# Patient Record
(demographics unavailable — no encounter records)

---

## 2022-07-08 NOTE — RAD REPORT
EXAM DESCRIPTION:  Rubén Single View7/8/2022 4:30 pm

 

CLINICAL HISTORY:  Palpitations

 

COMPARISON:  none

 

FINDINGS:   The lungs appear clear of acute infiltrate. The heart is normal size

 

IMPRESSION:   No acute abnormalities displayed

## 2022-07-08 NOTE — EDPHYS
Physician Documentation                                                                           

 [facility]                                                                 

Name: Kaylin Trivedi                                                                             

Age: 29 yrs                                                                                       

Sex: Female                                                                                       

: 1992                                                                                   

MRN: [id number]                                                                                   

Arrival Date: 2022                                                                          

Time: 15:11                                                                                       

Account#: [id number]                                                                            

Bed 4                                                                                             

Private MD:                                                                                       

ED Physician King Crespo                                                                         

HPI:                                                                                              

                                                                                             

15:42 This 29 yrs old  Female presents to ER via Ambulatory with complaints of rapid  ms3 

      heart rate, shaky, Near Syncope.                                                            

15:42 The patient has experienced near-syncope. Onset: The symptoms/episode began/occurred    ms3 

      acutely, 30 minute(s) ago. Duration: This was a single episode. Associated injury: The      

      patient did not suffer any apparent associated injury. Associated signs and symptoms:       

      Pertinent positives: shortness of breath, tingling. Current symptoms: shortness of          

      breath, chest pain.                                                                         

                                                                                                  

OB/GYN:                                                                                           

15:20 LMP 2022                                                                           ha1 

                                                                                                  

Historical:                                                                                       

- Allergies:                                                                                      

15:26 No Known Allergies;                                                                     aa5 

- PMHx:                                                                                           

15:26 None;                                                                                   aa5 

- PSHx:                                                                                           

15:26  section;                                                                       aa5 

                                                                                                  

- Immunization history:: Adult Immunizations unknown.                                             

- Social history:: Smoking status: Patient denies any tobacco usage or history of.                

                                                                                                  

                                                                                                  

ROS:                                                                                              

15:42 Constitutional: Negative for fever, and chills. ENT: Negative for injury, pain, and     ms3 

      discharge, Neck: Negative for injury, pain, and swelling.                                   

15:42 Abdomen/GI: Negative for abdominal pain, nausea, vomiting, diarrhea, and constipation,      

      MS/Extremity: Negative for injury and deformity, Skin: Negative for injury, rash, and       

      discoloration, Neuro: Negative for headache, weakness, numbness, tingling. Psych:           

      Negative for depression, anxiety, suicide ideation, homicidal ideation, and                 

      hallucinations.                                                                             

15:42 Cardiovascular: Positive for chest pain.                                                    

15:42 Respiratory: Positive for shortness of breath.                                              

15:42 All other systems are negative.                                                             

                                                                                                  

Exam:                                                                                             

15:41 ECG was reviewed by the Attending Physician.                                            ms3 

15:42 Constitutional:  This is a well developed, well nourished patient who is awake, alert,  ms3 

      and in no acute distress. Head/Face:  Normocephalic, atraumatic. Eyes:  Pupils equal        

      round and reactive to light, extra-ocular motions intact.  Lids and lashes normal.          

      Conjunctiva and sclera are non-icteric and not injected.  Periorbital areas with no         

      swelling, redness, or edema. Neck:  Trachea midline, no cervical lymphadenopathy.           

      Supple, full range of motion without nuchal rigidity, or vertebral point tenderness.        

      No Meningismus. Chest/axilla:  Normal chest wall appearance and motion.  Nontender with     

      no deformity.   Respiratory:  Lungs have equal breath sounds bilaterally, clear to          

      auscultation and percussion.  No rales, rhonchi or wheezes noted.  No increased work of     

      breathing, no retractions or nasal flaring. Abdomen/GI:  Soft, non-tender, with normal      

      bowel sounds.  No distension or tympany.  No guarding or rebound.  No evidence of           

      tenderness throughout. Skin:  Warm, dry with normal turgor.  Normal color with no           

      rashes, no lesions, and no evidence of cellulitis. MS/ Extremity:  Pulses equal, no         

      cyanosis.  Neurovascular intact.  Full, normal range of motion. Neuro:  Awake and           

      alert, GCS 15, oriented to person, place, time, and situation.  Cranial nerves II-XII       

      grossly intact.  Motor strength 5/5 in all extremities.  Sensory grossly intact.            

      Cerebellar exam normal.  Normal gait. Psych:  Awake, alert, with orientation to person,     

      place and time.  Behavior, mood, and affect are within normal limits.                       

15:42 Cardiovascular: Rate: tachycardic, Rhythm: regular, Pulses: no pulse deficits are           

      appreciated, Heart sounds: normal, Edema: is not appreciated.                               

                                                                                                  

Vital Signs:                                                                                      

15:15  / 93; Pulse 108; Resp 18 S; Temp 100.8(O); Pulse Ox 99% on R/A; Weight 86 kg     aa5 

      (R); Height 5 ft. 0 in. (152.40 cm) (R);                                                    

15:20  / 93 Supine; Pulse 104; Resp 16 S; Temp 100.8(T); Pulse Ox 99% on R/A;           ha1 

17:13  / 81; Pulse 87; Resp 16 S; Temp 99.9(O); Pulse Ox 99% on R/A;                    ha1 

18:00  / 76; Pulse 85 LA; Resp 16; Temp 99.8(O); Pulse Ox 100% on R/A;                  ha1 

15:15 Body Mass Index 37.03 (86.00 kg, 152.40 cm)                                             aa5 

                                                                                                  

MDM:                                                                                              

15:29 Patient medically screened.                                                             ms3 

18:52 Differential Diagnosis: cardiac arrhythmia, COVID vs Flu vs SVT. Data reviewed: vital   ms3 

      signs, nurses notes, lab test result(s), EKG, radiologic studies, and as a result, I        

      will discharge patient. Data interpreted: Cardiac monitor: rate is 93 beats/min, rhythm     

      is normal sinus rhythm, regular, with no ectopy, Interpretation: normal rate, normal        

      rhythm, Pulse oximetry: on room air is 100 %. Interpretation: normal. Counseling: I had     

      a detailed discussion with the patient and/or guardian regarding: the historical            

      points, exam findings, and any diagnostic results supporting the discharge/admit            

      diagnosis, lab results, radiology results, the need for outpatient follow up, to return     

      to the emergency department if symptoms worsen or persist or if there are any questions     

      or concerns that arise at home. ED course: Discussed chest x-ray, labs, EKG, physical       

      exam findings with patient. Patient to follow-up with primary care physician in 2 to 3      

      days. Patient understands and agrees with plan. All questions were answered. Return         

      precautions discussed include worsening symptoms, or any other concerns. On                 

      reevaluation patient is improved, alert and oriented x4, no apparent distress,              

      nontoxic, ambulatory in emergency department..                                              

                                                                                                  

                                                                                             

15:29 Order name: Basic Metabolic Panel; Complete Time: 17:02                                 ms3 

                                                                                             

15:29 Order name: CBC with Diff; Complete Time: 17:02                                         ms3 

                                                                                             

15:29 Order name: D-Dimer; Complete Time: 17:02                                               ms3 

                                                                                             

15:29 Order name: Troponin HS; Complete Time: 17:02                                           ms3 

                                                                                             

16:04 Order name: Flu; Complete Time: 17:02                                                   ha1 

                                                                                             

16:04 Order name: COVID-19 SARS RT PCR (Document "Date of Onset" if Symptomatic); Complete    ha1 

      Time: 17:53                                                                                 

                                                                                             

15:29 Order name: XRAY Chest (1 view); Complete Time: 17:02                                   ms3 

                                                                                             

15:29 Order name: EKG; Complete Time: 15:30                                                   ms3 

                                                                                             

15:29 Order name: Cardiac monitoring; Complete Time: 15:41                                    ms3 

                                                                                             

15:29 Order name: EKG - Nurse/Tech; Complete Time: 15:40                                      ms3 

                                                                                             

15:29 Order name: IV Saline Lock; Complete Time: 15:51                                        ms3 

07                                                                                             

15:29 Order name: Labs collected and sent; Complete Time: 15:52                               ms3 

                                                                                             

15:29 Order name: O2 Per Protocol; Complete Time: 15:52                                       ms3 

                                                                                             

15:29 Order name: O2 Sat Monitoring; Complete Time: 15:52                                     ms3 

                                                                                                  

ECG:                                                                                              

15:41 Rate is 116 beats/min. Rhythm is regular. QRS Axis is Normal. ME interval is normal. QT ms3 

      interval is normal. Clinical impression: Sinus tachycardia. Interpreted by me. Reviewed     

      by me.                                                                                      

                                                                                                  

Administered Medications:                                                                         

17:13 Drug: Potassium Chloride 40 mEq Route: PO;                                              aa5 

19:05 Follow up: Response: No adverse reaction                                                ha1 

17:13 Drug: Potassium Effervescent Tablet 25 mEq Route: PO;                                   aa5 

19:05 Follow up: Response: No adverse reaction                                                ha1 

                                                                                                  

                                                                                                  

Disposition Summary:                                                                              

22 18:40                                                                                    

Discharge Ordered                                                                                 

      Location: Home                                                                          ms3 

      Problem: new                                                                            ms3 

      Symptoms: are unchanged                                                                 ms3 

      Condition: Stable                                                                       ms3 

      Diagnosis                                                                                   

        - Hypokalemia                                                                         ms3 

        - Fever, unspecified                                                                  ms3 

        - SARS-associated coronavirus as the cause of diseases classified elsewhere           ms3 

      Followup:                                                                               ms3 

        - With:                                                                                    

        - When: 2 - 3 days                                                                         

        - Reason: Re-evaluation by your physician                                                  

      Discharge Instructions:                                                                     

        - Discharge Summary Sheet                                                             ms3 

        - Fever, Adult                                                                        ms3 

        - Hypokalemia                                                                         ms3 

      Forms:                                                                                      

        - Medication Reconciliation Form                                                      ms3 

        - Thank You Letter                                                                    ms3 

        - Antibiotic Education                                                                ms3 

        - Prescription Opioid Use                                                             ms3 

Signatures:                                                                                       

Dispatcher [employer]                           Toshia Nice, RN                     RN   aa5                                                  

King Crespo DO                        DO   ms3                                                  

Mariah Gonzales RN   ha1                                                  

                                                                                                  

**************************************************************************************************

## 2022-07-08 NOTE — ER
Nurse's Notes                                                                                     

 [facility]                                                                 

Name: Kaylin Trivedi                                                                             

Age: 29 yrs                                                                                       

Sex: Female                                                                                       

: 1992                                                                                   

MRN: [id number]                                                                                   

Arrival Date: 2022                                                                          

Time: 15:11                                                                                       

Account#: [id number]                                                                            

Bed 4                                                                                             

Private MD:                                                                                       

Diagnosis: Hypokalemia;Fever, unspecified;SARS-associated coronavirus as the cause of diseases    

  classified elsewhere                                                                            

                                                                                                  

Presentation:                                                                                     

                                                                                             

15:15 Chief complaint: Patient states: heart palpitations, SOB, left sided chest pain with    aa5 

      left arm tingling that began 30-40 minutes ago.                                             

15:15 Coronavirus screen: shortness of breath. Ebola Screen: No symptoms or risks identified  aa5 

      at this time. Initial Sepsis Screen: Does the patient meet any 2 criteria? HR > 90 bpm.     

      Does the patient have a suspected source of infection? No. Patient's initial sepsis         

      screen is negative. Risk Assessment: Do you want to hurt yourself or someone else?          

      Patient reports no desire to harm self or others. Onset of symptoms was 2022.      

15:15 Acuity: NATHAN 3                                                                           aa5 

15:15 Method Of Arrival: Ambulatory                                                           aa5 

                                                                                                  

OB/GYN:                                                                                           

15:20 LMP 2022                                                                           ha1 

                                                                                                  

Historical:                                                                                       

- Allergies:                                                                                      

15:26 No Known Allergies;                                                                     aa5 

- PMHx:                                                                                           

15:26 None;                                                                                   aa5 

- PSHx:                                                                                           

15:26  section;                                                                       aa5 

                                                                                                  

- Immunization history:: Adult Immunizations unknown.                                             

- Social history:: Smoking status: Patient denies any tobacco usage or history of.                

                                                                                                  

                                                                                                  

Screening:                                                                                        

15:20 Abuse screen: Denies threats or abuse. Nutritional screening: No deficits noted.        ha1 

      Tuberculosis screening: No symptoms or risk factors identified. Fall Risk None              

      identified.                                                                                 

                                                                                                  

Assessment:                                                                                       

15:20 General: Appears comfortable, Behavior is calm, Reports chills for 12-24 hours, fever   ha1 

      for 12-24 hours.                                                                            

15:20 Pain: Denies pain. Complains of pain in anterior aspect of left upper chest Pain        ha1 

      radiates to left arm Pain currently is 7 out of 10 on a pain scale. Quality of pain is      

      described as pressure, squeezing, Pain began gradually, gets worst when walking Is          

      intermittent, Alleviated by walking Aggravated by sitting Noted to be Also complains of     

      decreased appetite, shortness of breath.                                                    

15:20 Neuro: Level of Consciousness is awake, alert, obeys commands, Oriented to person,      ha1 

      place, time, situation,  are equal bilaterally Moves all extremities. Full             

      function Gait is steady, Speech is normal, Facial symmetry appears normal, Pupils are       

      PERRLA, Reports.                                                                            

15:20 Cardiovascular: Heart tones S1 S2 present Capillary refill < 3 seconds Patient's skin   ha1 

      is warm and dry. Pulses are all present. Rhythm is sinus rhythm Chest pain is located       

      in anterior.                                                                                

15:20 Respiratory: Airway is patent Breath sounds are clear bilaterally. Onset: The           ha1 

      symptoms/episode began/occurred the patient has mild shortness of breath. GI: No signs      

      and/or symptoms were reported involving the gastrointestinal system.                        

15:20 : No signs and/or symptoms were reported regarding the genitourinary system.          ha1 

15:20 EENT: No signs and/or symptoms were reported regarding the EENT system. Derm: Skin is   ha1 

      intact, is healthy with good turgor, Skin is dry, Skin is pink, warm \T\ dry. Reports       

      tingling, at left arm. Musculoskeletal: Capillary refill < 3 seconds, Range of motion:      

      intact in all extremities,                                                                  

16:20 Reassessment: Patient and/or family updated on plan of care and expected duration. Pain ha1 

      level reassessed. Patient is alert, oriented x 3, equal unlabored respirations, skin        

      warm/dry/pink. Patient denies pain at this time.                                            

17:13 Reassessment: Patient is alert, oriented x 3, equal unlabored respirations, skin        aa5 

      warm/dry/pink.                                                                              

19:05 Reassessment: Patient is alert, oriented x 3, equal unlabored respirations, skin        ha1 

      warm/dry/pink.                                                                              

                                                                                                  

Vital Signs:                                                                                      

15:15  / 93; Pulse 108; Resp 18 S; Temp 100.8(O); Pulse Ox 99% on R/A; Weight 86 kg     aa5 

      (R); Height 5 ft. 0 in. (152.40 cm) (R);                                                    

15:20  / 93 Supine; Pulse 104; Resp 16 S; Temp 100.8(T); Pulse Ox 99% on R/A;           ha1 

17:13  / 81; Pulse 87; Resp 16 S; Temp 99.9(O); Pulse Ox 99% on R/A;                    ha1 

18:00  / 76; Pulse 85 LA; Resp 16; Temp 99.8(O); Pulse Ox 100% on R/A;                  ha1 

15:15 Body Mass Index 37.03 (86.00 kg, 152.40 cm)                                             aa5 

                                                                                                  

ED Course:                                                                                        

15:11 Patient arrived in ED.                                                                  am2 

15:15 Arm band placed on Patient placed in an exam room, on a stretcher.                      aa5 

15:16 King Crespo DO is Attending Physician.                                                ms3 

15:20 Patient has correct armband on for positive identification. Placed in gown. Bed in low  ha1 

      position. Call light in reach. Side rails up X2.                                            

15:26 Triage completed.                                                                       aa5 

15:26 EKG done, by ED staff, reviewed by King Crespo DO.                                      3 

15:35 No provider procedures requiring assistance completed. Initial lab(s) drawn, by me,     ha1 

      sent to lab. Inserted saline lock: 20 gauge in right antecubital area, using aseptic        

      technique. Blood collected.                                                                 

15:51 Mariah Gonzales RN is Primary Nurse.                                                      ha1 

16:32 XRAY Chest (1 view) In Process Unspecified.                                             EDMS

18:40 Ap Malik DO is Referral Physician.                                                ms3 

19:02 IV discontinued, intact, bleeding controlled, No redness/swelling at site. Pressure     ha1 

      dressing applied.                                                                           

                                                                                                  

Administered Medications:                                                                         

17:13 Drug: Potassium Chloride 40 mEq Route: PO;                                              aa5 

19:05 Follow up: Response: No adverse reaction                                                ha1 

17:13 Drug: Potassium Effervescent Tablet 25 mEq Route: PO;                                   aa5 

19:05 Follow up: Response: No adverse reaction                                                ha1 

                                                                                                  

                                                                                                  

Medication:                                                                                       

19:05 VIS not applicable for this client.                                                     ha1 

                                                                                                  

Outcome:                                                                                          

18:40 Discharge ordered by MD.                                                                ms3 

19:05 Patient left the ED.                                                                    ha1 

19:05 Discharged to home ambulatory.                                                          ha1 

19:05 Condition: stable                                                                           

19:05 Discharge instructions given to patient, Instructed on discharge instructions, follow       

      up and referral plans. Demonstrated understanding of instructions, follow-up care.          

                                                                                                  

Signatures:                                                                                       

Dispatcher [employer]                           EDMS                                                 

Toshia Oscar RN                     RN   aa5                                                  

Christine Garcia                               am2                                                  

Rossy James                              3                                                  

King Crespo DO                        DO   ms3                                                  

Mariah Gonzales RN RN   \[facility]\""                                                  

                                                                                                  

Corrections: (The following items were deleted from the chart)                                    

15:27 15:15 Initial Sepsis Screen: Does the patient meet any 2 criteria? No. Patient's        aa5 

      initial sepsis screen is negative. Does the patient have a suspected source of              

      infection? No. Patient's initial sepsis screen is negative. aa5                             

15:57 15:15 Pulse 108bpm; Resp 18bpm; Spontaneous; Pulse Ox 99% RA; aa5                       aa5 

16:42 16:29 Neuro: Level of Consciousness is awake, alert, obeys commands, Oriented to        ha1 

      person, place, time, situation,  are equal bilaterally Moves all extremities. Full     

      function Gait is steady, Speech is normal, Facial symmetry appears normal, Pupils are       

      PERRLA, Reports ha1                                                                         

17:44 15:20 Pain: Denies pain. ha1                                                            ha1 

18:37 17:13  / 81; Pulse 87bpm; Resp 16bpm; Spontaneous; Pulse Ox 99% RA; Temp 99.9F    ha1 

      Oral; aa5                                                                                   

19:13 19:11 Patient left the ED. ha1                                                          ha1 

19:14 15:20  / 81; Pulse 87bpm; Resp 16bpm; Spontaneous; Pulse Ox 99% RA; Temp 99.9F    ha1 

      Oral; ha1                                                                                   

19:26 19:25 Response: No adverse reaction ha1                                                 ha1 

                                                                                                  

**************************************************************************************************

## 2022-07-11 NOTE — EKG
Test Date:    2022-07-08               Test Time:    15:21:56

Technician:   GE                                     

                                                     

MEASUREMENT RESULTS:                                       

Intervals:                                           

Rate:         116                                    

VA:           160                                    

QRSD:         80                                     

QT:           322                                    

QTc:          447                                    

Axis:                                                

P:            62                                     

VA:           160                                    

QRS:          29                                     

T:            57                                     

                                                     

INTERPRETIVE STATEMENTS:                                       

                                                     

Sinus tachycardia

Otherwise normal ECG

No previous ECG available for comparison



Electronically Signed On 07-11-22 13:48:47 CDT by Leif Allen